# Patient Record
Sex: FEMALE | Race: WHITE | NOT HISPANIC OR LATINO | Employment: FULL TIME | URBAN - METROPOLITAN AREA
[De-identification: names, ages, dates, MRNs, and addresses within clinical notes are randomized per-mention and may not be internally consistent; named-entity substitution may affect disease eponyms.]

---

## 2018-01-02 ENCOUNTER — OFFICE VISIT (OUTPATIENT)
Dept: ORTHOPEDIC SURGERY | Facility: CLINIC | Age: 53
End: 2018-01-02

## 2018-01-02 VITALS
BODY MASS INDEX: 38.2 KG/M2 | WEIGHT: 223.77 LBS | HEART RATE: 64 BPM | SYSTOLIC BLOOD PRESSURE: 137 MMHG | HEIGHT: 64 IN | DIASTOLIC BLOOD PRESSURE: 95 MMHG

## 2018-01-02 DIAGNOSIS — M25.512 LEFT SHOULDER PAIN, UNSPECIFIED CHRONICITY: Primary | ICD-10-CM

## 2018-01-02 DIAGNOSIS — M19.019 AC (ACROMIOCLAVICULAR) ARTHRITIS: ICD-10-CM

## 2018-01-02 PROCEDURE — 20605 DRAIN/INJ JOINT/BURSA W/O US: CPT | Performed by: ORTHOPAEDIC SURGERY

## 2018-01-02 PROCEDURE — 99203 OFFICE O/P NEW LOW 30 MIN: CPT | Performed by: ORTHOPAEDIC SURGERY

## 2018-01-02 RX ORDER — BENZONATATE 200 MG/1
CAPSULE ORAL
Refills: 0 | COMMUNITY
Start: 2017-10-04 | End: 2018-10-04

## 2018-01-02 RX ORDER — LORATADINE 10 MG/1
10 TABLET ORAL DAILY
COMMUNITY

## 2018-01-02 RX ORDER — TRIAMCINOLONE ACETONIDE 40 MG/ML
20 INJECTION, SUSPENSION INTRA-ARTICULAR; INTRAMUSCULAR
Status: COMPLETED | OUTPATIENT
Start: 2018-01-02 | End: 2018-01-02

## 2018-01-02 RX ORDER — LOSARTAN POTASSIUM 50 MG/1
TABLET ORAL
Refills: 1 | COMMUNITY
Start: 2017-10-06 | End: 2019-09-17 | Stop reason: ALTCHOICE

## 2018-01-02 RX ORDER — LIDOCAINE HYDROCHLORIDE 10 MG/ML
1 INJECTION, SOLUTION INFILTRATION; PERINEURAL
Status: COMPLETED | OUTPATIENT
Start: 2018-01-02 | End: 2018-01-02

## 2018-01-02 RX ORDER — CETIRIZINE HYDROCHLORIDE 10 MG/1
10 TABLET ORAL DAILY
COMMUNITY

## 2018-01-02 RX ADMIN — TRIAMCINOLONE ACETONIDE 20 MG: 40 INJECTION, SUSPENSION INTRA-ARTICULAR; INTRAMUSCULAR at 15:16

## 2018-01-02 RX ADMIN — LIDOCAINE HYDROCHLORIDE 1 ML: 10 INJECTION, SOLUTION INFILTRATION; PERINEURAL at 15:16

## 2018-01-02 NOTE — PROGRESS NOTES
[facility] [place] Orthopaedic Surgery Clinic Note    Subjective     Pain of the Left Shoulder (Shoulder pain for 1.5 years with no known injury. She had a cortisone 1 year ago and that helped for only a few days. She tried an oral steroid which also helped. Pain today is minimal. The shoulder only really hurts with movement.)      CURT Alamo is a 52 y.o. female. Patient has had a year and a half history of left shoulder pain.  She is left-hand dominant.  She denies any history of trauma.  She's had a subacromial injection given anterolaterally by my former partner Dr. Childress which only helped for a few days.  She continues to have what she qualifies as a nagging type pain in the left shoulder.  She occasionally has pain in her armpit and her left chest wall.  She has some pain on the back of her arm to the elbow but rarely does she have any symptoms below the elbow.  She has tried anti-inflammatories without a lot of relief.  She is here with her  today for further evaluation and treatment     Past Medical History:   Diagnosis Date   • Hypertension       Past Surgical History:   Procedure Laterality Date   • CHOLECYSTECTOMY     • TUBAL ABDOMINAL LIGATION        Family History   Problem Relation Age of Onset   • Cancer Mother    • Diabetes Father    • Heart attack Father      Social History     Social History   • Marital status:      Spouse name: N/A   • Number of children: N/A   • Years of education: N/A     Occupational History   • Not on file.     Social History Main Topics   • Smoking status: Never Smoker   • Smokeless tobacco: Never Used   • Alcohol use No   • Drug use: No   • Sexual activity: Defer     Other Topics Concern   • Not on file     Social History Narrative   • No narrative on file      No current outpatient prescriptions on file prior to visit.     No current facility-administered medications on file prior to visit.       No Known Allergies     The following portions of the patient's  "history were reviewed and updated as appropriate: allergies, current medications, past family history, past medical history, past social history, past surgical history and problem list.    Review of Systems   Constitutional: Negative.    HENT: Negative.    Eyes: Negative.    Respiratory: Negative.    Cardiovascular: Negative.    Gastrointestinal: Negative.    Endocrine: Positive for cold intolerance.   Genitourinary: Negative.    Musculoskeletal: Positive for arthralgias and joint swelling.   Skin: Negative.    Allergic/Immunologic: Positive for environmental allergies.   Neurological: Negative.    Hematological: Negative.    Psychiatric/Behavioral: Negative.         Objective      Physical Exam  /95  Pulse 64  Ht 162.6 cm (64\")  Wt 101 kg (223 lb 12.3 oz)  BMI 38.41 kg/m2    Body mass index is 38.41 kg/(m^2).    General  Mental Status - alert  General Appearance - cooperative, well groomed, not in acute distress  Orientation - Oriented X3  Build & Nutrition - well developed and well nourished  Posture - normal posture  Gait - normal gait     Integumentary  Global Assessment  Examination of related systems reveals - no lymphadenopathy  General Characteristics  Overall examination of the patient's skin reveals - no rashes, no evidence of scars, no suspicious lesions and no bruises.  Color - normal coloration of skin.    Ortho Exam  Musculoskeletal   Upper Extremity   Left Shoulder     Inspection and Palpation:     Tenderness - mildly over left acromioclavicular joint    Crepitus - none    Sensation is normal    Examination reveals no ecchymosis.      Strength and Tone:    Supraspinatus - 4+ out of 5 with pain    External Rotators-5 out of 5    Infraspinatus - 5/5    Subscapularis - 5 out of 5    Deltoid - 5/5     Range of Motion   Right shoulder:    Internal Rotation: ROM - T7    External Rotation: AROM - 80 degrees    Elevation through flexion: AROM - 180 degrees    Left Shoulder:    Internal Rotation: ROM " - L4    External Rotation: AROM - 70 degrees    Elevation through flexion: AROM - 150 degrees      Instability   Left shoulder    Sulcus sign negative    Apprehension test negative    Chase relocation test negative    Jerk test negative     Impingement   Left shoulder    Kee-Jeferson impingement test positive    Neer impingement test positive     Functional Testing   Left shoulder    AC crossover adduction test positive    Abdominal compression test negative    Lift-off sign negative    Speed's test negative    Harrison's test negative    Hornblower's sign negative     Imaging/Studies  3 views of the left shoulder are taken in the office today and reviewed.  Patient has a type II acromion, no significant glenohumeral degenerative changes, patient has hypertrophy at the distal clavicle with moderate acromioclavicular joint space narrowing.    Assessment:  1. Left shoulder pain, unspecified chronicity    2. AC (acromioclavicular) arthritis        Plan:  1. Diagnostic and therapeutic injection into the left acromial clavicular joint will be given today  2. Continue on over-the-counter medications as needed for discomfort  3. Follow-up in a month and assess her pain and response to the injection.  We have recommended an MRI today but the patient is apprehensive because of the cost.  If she is not a lot better, we can consider further imaging.  4. Cervical spine must be kept in mind here given the axillary pain.      Medical Decision Making  Management Options : over-the-counter medicine and prescription/IM medicine  Data/Risk: radiology tests and independent visualization of imaging, lab tests, or EMG/NCV    Dashawn Love MD  01/02/18  3:16 PM

## 2018-01-02 NOTE — PROGRESS NOTES
Procedure   Medium Joint Arthrocentesis  Date/Time: 1/2/2018 3:16 PM  Consent given by: patient  Site marked: site marked  Timeout: Immediately prior to procedure a time out was called to verify the correct patient, procedure, equipment, support staff and site/side marked as required   Supporting Documentation  Indications: pain   Procedure Details  Location: shoulder - L acromioclavicular  Preparation: Patient was prepped and draped in the usual sterile fashion  Needle size: 25 G  Approach: superior  Medications administered: 1 mL lidocaine 1 %; 20 mg triamcinolone acetonide 40 MG/ML

## 2018-10-03 NOTE — PROGRESS NOTES
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      CC:  Chief Complaint   Patient presents with   • Hypertension     Consult   • Chest Pain   • Shortness of Breath   • Irregular Heart Beat       PROBLEM LIST:  1.  Chest pain syndrome.    a. 09/28/2012:  Lexiscan Cardiolite within  normal limits with perfusion consistent with left bundle branch block.  EF 60%.    b. Echocardiogram, September 2012; normal LVEF, left bundle depolarization, mild MR/TR.   c. 9/30/14 Mavis scan Cardiolite no evidence of ischemia, fixed anterior septal and anterior wall defects without corresponding wall motion abnormality likely artifact d/t LBBB  2. Dyslipidemia.    a.  CRP 26 with LDL of 100.  Intolerant to statin therapy.    3. Hypertension.  4. LBBB  5. Obesity.    6. Left bundle branch block.    7. GERD.    8. Surgical history:    a. Cholecystectomy.  b. BTL.      Allergies  Allergies   Allergen Reactions   • Lisinopril Cough       Current Medications    Current Outpatient Prescriptions:   •  cetirizine (zyrTEC) 10 MG tablet, Take 10 mg by mouth Daily., Disp: , Rfl:   •  loratadine (CLARITIN) 10 MG tablet, Take 10 mg by mouth Daily., Disp: , Rfl:   •  losartan (COZAAR) 50 MG tablet, , Disp: , Rfl: 1  •  Multiple Vitamins-Minerals (CENTRUM SILVER 50+WOMEN PO), Take 1 tablet by mouth Daily., Disp: , Rfl:      History of Present Illness   HPI  This is a 53-year-old female with the above-mentioned PMH who presents for consult from [facility] for evaluation of hypertension.  The patient had previously been seen by a clinic in 2015.    Recently w [facility] and [facility].  Notes is a vegetarian but enjoys sodas and [employer] foods most am.  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      Pt denies any  orthopnea, PND, palpitations, lower extremity edema, or claudication. Pt denies history of CHF, DVT, PE, MI, CVA, TIA, or rheumatic fever.       ROS  Review of Systems   Constitution: Positive for weight gain. Negative for chills, fever, weakness, malaise/fatigue, night sweats and weight loss.   HENT: Negative for hearing loss and nosebleeds.    Eyes: Negative for blurred vision, vision loss in left eye, vision loss in right eye, visual disturbance and visual halos.   Cardiovascular: Positive for chest pain and dyspnea on exertion. Negative for claudication, cyanosis, irregular heartbeat, leg swelling, near-syncope, orthopnea, palpitations, paroxysmal nocturnal dyspnea and syncope.   Respiratory: Negative for cough, hemoptysis, shortness of breath, snoring and wheezing.    Endocrine: Negative for cold intolerance, heat intolerance, polydipsia, polyphagia and polyuria.   Hematologic/Lymphatic: Negative for adenopathy and bleeding problem. Does not bruise/bleed easily.   Skin: Negative for dry skin, poor wound healing and rash.   Musculoskeletal: Negative for falls, joint pain, joint swelling, muscle cramps, muscle weakness, myalgias and neck pain.   Gastrointestinal: Negative for bloating, abdominal pain, change in bowel habit, bowel incontinence, constipation, diarrhea, dysphagia, excessive appetite, heartburn, hematemesis, hematochezia, jaundice, melena, nausea and vomiting.   Genitourinary: Negative for bladder incontinence, dysuria, flank pain, hematuria, hesitancy and nocturia.   Neurological: Negative for aphonia, excessive daytime sleepiness, dizziness, focal weakness, headaches, light-headedness, loss of balance, seizures, sensory change, tremors and vertigo.   Psychiatric/Behavioral: Negative for altered mental status, depression, memory loss, substance abuse and suicidal ideas. The patient is not nervous/anxious.    All other systems reviewed and are negative.      SOCIAL HX  Social  "History     Social History   • Marital status:      Spouse name: N/A   • Number of children: N/A   • Years of education: N/A     Occupational History   • Not on file.     Social History Main Topics   • Smoking status: Never Smoker   • Smokeless tobacco: Never Used   • Alcohol use No   • Drug use: No   • Sexual activity: Defer     Other Topics Concern   • Not on file     Social History Narrative   • No narrative on file       FAMILY HX  Family History   Problem Relation Age of Onset   • Cancer Mother    • Diabetes Father    • Heart attack Father        Vitals:    10/04/18 1042   BP: 121/81   BP Location: Left arm   Patient Position: Sitting   Pulse: 69   Weight: 105 kg (231 lb)   Height: 165.1 cm (65\")       PHYSICAL EXAMINATION:  Physical Exam   Constitutional: She is oriented to person, place, and time. She appears well-developed and well-nourished. No distress.   HENT:   Head: Normocephalic and atraumatic.   Nose: Nose normal.   Mouth/Throat: Uvula is midline, oropharynx is clear and moist and mucous membranes are normal.   Eyes: Pupils are equal, round, and reactive to light. Conjunctivae and EOM are normal. No scleral icterus.   Neck: Normal range of motion. Neck supple. No hepatojugular reflux and no JVD present. Carotid bruit is not present. No tracheal deviation present. No thyromegaly present.   Cardiovascular: Normal rate, regular rhythm, S1 normal, S2 normal, intact distal pulses and normal pulses.  PMI is not displaced.  Exam reveals no gallop, no distant heart sounds, no friction rub, no midsystolic click and no opening snap.    No murmur heard.  Pulses:       Radial pulses are 2+ on the right side, and 2+ on the left side.        Dorsalis pedis pulses are 2+ on the right side, and 2+ on the left side.        Posterior tibial pulses are 2+ on the right side, and 2+ on the left side.   Pulmonary/Chest: Effort normal and breath sounds normal. She has no wheezes. She has no rhonchi. She has no rales. "   Abdominal: Soft. Bowel sounds are normal. She exhibits no mass. There is no tenderness. There is no guarding.   Musculoskeletal: She exhibits no edema or tenderness.   Lymphadenopathy:     She has no cervical adenopathy.   Neurological: She is alert and oriented to person, place, and time.   Skin: Skin is warm, dry and intact. No rash noted. No cyanosis or erythema. Nails show no clubbing.   Psychiatric: She has a normal mood and affect. Her behavior is normal.   Nursing note and vitals reviewed.      Diagnostic Data:    ECG 12 Lead  Date/Time: 10/4/2018 10:52 AM  Performed by: WHIT SPENCER  Authorized by: WHIT SPENCER   Comparison: not compared with previous ECG   Rhythm: sinus rhythm  Conduction: complete LBBB  Clinical impression: non-specific ECG          Lab Results   Component Value Date    CHLPL 177 09/30/2014    TRIG 108 09/30/2014    HDL 37 (L) 09/30/2014     Lab Results   Component Value Date    GLUCOSE 106 (H) 09/30/2014    BUN 11 09/30/2014    CREATININE 0.8 09/30/2014     09/30/2014    K 4.4 09/30/2014     (H) 09/30/2014    CO2 27 09/30/2014     Lab Results   Component Value Date    HGBA1C 5.7 09/30/2014         ASSESSMENT:   Diagnosis Plan   1. Essential hypertension  ECG 12 Lead   2. LBBB (left bundle branch block)     3. Mixed hyperlipidemia     4. Unstable angina pectoris (CMS/HCC)           PLAN:  Lexiscan cardiolite to assess for ischemic burden.    COntinue current antihypertensive.    Exercise regimen detailed in clinic.    Intermittent fasting and CHO restriction discussed.    Scribed for Whit Spencer MD by Heidy East PA-C. 10/4/2018  11:10 AM     Whit Spencer MD, [facility]

## 2018-10-04 ENCOUNTER — CONSULT (OUTPATIENT)
Dept: CARDIOLOGY | Facility: CLINIC | Age: 53
End: 2018-10-04

## 2018-10-04 VITALS
DIASTOLIC BLOOD PRESSURE: 81 MMHG | HEIGHT: 65 IN | HEART RATE: 69 BPM | BODY MASS INDEX: 38.49 KG/M2 | SYSTOLIC BLOOD PRESSURE: 121 MMHG | WEIGHT: 231 LBS

## 2018-10-04 DIAGNOSIS — I10 ESSENTIAL HYPERTENSION: Primary | ICD-10-CM

## 2018-10-04 DIAGNOSIS — I20.0 UNSTABLE ANGINA PECTORIS (HCC): ICD-10-CM

## 2018-10-04 DIAGNOSIS — I44.7 LBBB (LEFT BUNDLE BRANCH BLOCK): ICD-10-CM

## 2018-10-04 DIAGNOSIS — E78.2 MIXED HYPERLIPIDEMIA: ICD-10-CM

## 2018-10-04 PROCEDURE — 99244 OFF/OP CNSLTJ NEW/EST MOD 40: CPT | Performed by: INTERNAL MEDICINE

## 2018-10-04 PROCEDURE — 93000 ELECTROCARDIOGRAM COMPLETE: CPT | Performed by: INTERNAL MEDICINE

## 2018-10-19 ENCOUNTER — HOSPITAL ENCOUNTER (OUTPATIENT)
Dept: CARDIOLOGY | Facility: HOSPITAL | Age: 53
Discharge: HOME | End: 2018-10-19
Attending: INTERNAL MEDICINE

## 2018-10-19 VITALS — BODY MASS INDEX: 38.49 KG/M2 | HEIGHT: 65 IN | WEIGHT: 231 LBS

## 2018-10-19 DIAGNOSIS — I20.0 UNSTABLE ANGINA PECTORIS (HCC): ICD-10-CM

## 2018-10-19 PROCEDURE — 78452 HT MUSCLE IMAGE SPECT MULT: CPT

## 2018-10-19 PROCEDURE — 93017 CV STRESS TEST TRACING ONLY: CPT

## 2018-10-19 PROCEDURE — A9500 TC99M SESTAMIBI: HCPCS | Performed by: INTERNAL MEDICINE

## 2018-10-19 PROCEDURE — 93018 CV STRESS TEST I&R ONLY: CPT | Performed by: INTERNAL MEDICINE

## 2018-10-19 PROCEDURE — 78452 HT MUSCLE IMAGE SPECT MULT: CPT | Performed by: INTERNAL MEDICINE

## 2018-10-19 PROCEDURE — 25010000002 REGADENOSON 0.4 MG/5ML SOLUTION: Performed by: INTERNAL MEDICINE

## 2018-10-19 PROCEDURE — 0 TECHNETIUM SESTAMIBI: Performed by: INTERNAL MEDICINE

## 2018-10-19 RX ADMIN — TECHNETIUM TC 99M SESTAMIBI 1 DOSE: 1 INJECTION INTRAVENOUS at 09:55

## 2018-10-19 RX ADMIN — TECHNETIUM TC 99M SESTAMIBI 1 DOSE: 1 INJECTION INTRAVENOUS at 08:25

## 2018-10-19 RX ADMIN — REGADENOSON 0.4 MG: 0.08 INJECTION, SOLUTION INTRAVENOUS at 09:55

## 2018-10-22 LAB
BH CV NUCLEAR PRIOR STUDY: 1
BH CV STRESS BP STAGE 1: NORMAL
BH CV STRESS BP STAGE 2: NORMAL
BH CV STRESS COMMENTS STAGE 1: NORMAL
BH CV STRESS COMMENTS STAGE 2: NORMAL
BH CV STRESS DOSE REGADENOSON STAGE 1: 0.4
BH CV STRESS DURATION MIN STAGE 1: 4
BH CV STRESS DURATION MIN STAGE 2: 2
BH CV STRESS DURATION SEC STAGE 1: 0
BH CV STRESS DURATION SEC STAGE 2: 0
BH CV STRESS HR STAGE 1: 87
BH CV STRESS HR STAGE 2: 78
BH CV STRESS PROTOCOL 1: NORMAL
BH CV STRESS RECOVERY BP: NORMAL MMHG
BH CV STRESS RECOVERY HR: 80 BPM
BH CV STRESS STAGE 1: 1
BH CV STRESS STAGE 2: 2
LV EF NUC BP: 51 %
MAXIMAL PREDICTED HEART RATE: 165 BPM
PERCENT MAX PREDICTED HR: 59.39 %
STRESS BASELINE BP: NORMAL MMHG
STRESS BASELINE HR: 55 BPM
STRESS PERCENT HR: 70 %
STRESS POST PEAK BP: NORMAL MMHG
STRESS POST PEAK HR: 98 BPM
STRESS TARGET HR: 140 BPM

## 2018-10-24 ENCOUNTER — TELEPHONE (OUTPATIENT)
Dept: CARDIOLOGY | Facility: CLINIC | Age: 53
End: 2018-10-24

## 2018-10-24 NOTE — TELEPHONE ENCOUNTER
Called patient and left VM stress test is low risk at current. Unchanged from 2014, follow up as scheduled.

## 2019-04-18 ENCOUNTER — OFFICE VISIT (OUTPATIENT)
Dept: ORTHOPEDIC SURGERY | Facility: CLINIC | Age: 54
End: 2019-04-18

## 2019-04-18 VITALS — HEIGHT: 65 IN | OXYGEN SATURATION: 97 % | WEIGHT: 233.91 LBS | BODY MASS INDEX: 38.97 KG/M2 | HEART RATE: 68 BPM

## 2019-04-18 DIAGNOSIS — M65.332 TRIGGER MIDDLE FINGER OF LEFT HAND: Primary | ICD-10-CM

## 2019-04-18 DIAGNOSIS — M79.642 LEFT HAND PAIN: ICD-10-CM

## 2019-04-18 DIAGNOSIS — M25.642 STIFFNESS OF FINGER JOINT OF LEFT HAND: ICD-10-CM

## 2019-04-18 DIAGNOSIS — M19.012 ARTHRITIS OF LEFT ACROMIOCLAVICULAR JOINT: ICD-10-CM

## 2019-04-18 PROCEDURE — 99214 OFFICE O/P EST MOD 30 MIN: CPT | Performed by: ORTHOPAEDIC SURGERY

## 2019-04-18 PROCEDURE — 20550 NJX 1 TENDON SHEATH/LIGAMENT: CPT | Performed by: ORTHOPAEDIC SURGERY

## 2019-04-18 RX ORDER — TRIAMCINOLONE ACETONIDE 40 MG/ML
20 INJECTION, SUSPENSION INTRA-ARTICULAR; INTRAMUSCULAR
Status: COMPLETED | OUTPATIENT
Start: 2019-04-18 | End: 2019-04-18

## 2019-04-18 RX ORDER — LIDOCAINE HYDROCHLORIDE 10 MG/ML
1 INJECTION, SOLUTION INFILTRATION; PERINEURAL
Status: COMPLETED | OUTPATIENT
Start: 2019-04-18 | End: 2019-04-18

## 2019-04-18 RX ADMIN — TRIAMCINOLONE ACETONIDE 20 MG: 40 INJECTION, SUSPENSION INTRA-ARTICULAR; INTRAMUSCULAR at 09:56

## 2019-04-18 RX ADMIN — LIDOCAINE HYDROCHLORIDE 1 ML: 10 INJECTION, SOLUTION INFILTRATION; PERINEURAL at 09:56

## 2019-04-18 NOTE — PROGRESS NOTES
Procedure   Trigger finger injection  Site marked: site marked  Timeout: Immediately prior to procedure a time out was called to verify the correct patient, procedure, equipment, support staff and site/side marked as required   Supporting Documentation  Indications: pain   Procedure Details  Location: long finger - Long finger joint: trigger finger.  Preparation: Patient was prepped and draped in the usual sterile fashion  Needle size: 25 G  Approach: volar  Medications administered: 1 mL lidocaine 1 %; 20 mg triamcinolone acetonide 40 MG/ML  Patient tolerance: patient tolerated the procedure well with no immediate complications

## 2019-04-18 NOTE — PROGRESS NOTES
"    [facility] [place] Orthopa[facility] Surgery Clinic Note    Subjective     CC: Pain of the Left Hand (Left hand pain and swelling x 7 months ,middle finger area of hand, has taken Advil, prednisone, medrol dose pack, pain is a 3/10.)      CURT Alamo is a 53 y.o. female.  Patient is here today for follow-up of her left shoulder and for new problem today regarding her left long digit.  At her visit on 1/2/2018, she was injected into the left AC joint and has gotten good relief from that injection.    With regards to her left long digit, this has bothered her for the last 6-8 months.  She has had 2 rounds of oral prednisone which has helped but not lasted.  She has stiffness in the left long digit and difficulty moving the finger as well as she would like.  No obvious catching.  She has a history of a trigger finger on another hand.  She rates the pain as 3 out of 10.      ROS:    Constiutional:Pt denies fever, chills, nausea, or vomiting.  MSK:as above    Objective      Past Medical History  Past Medical History:   Diagnosis Date   • Anemia    • Edema    • Hyperlipidemia    • Hypertension          Physical Exam  Pulse 68   Ht 165.1 cm (65\")   Wt 106 kg (233 lb 14.5 oz)   SpO2 97%   BMI 38.92 kg/m²     Body mass index is 38.92 kg/m².    Patient is well nourished and well developed.        Ortho Exam  Peripheral Vascular   Bilateral Upper Extremity    No cyanotic nail beds    Pink nail beds and rapid capillary refill   Palpation    Radial Pulse - Bilaterally normal    Neurologic   Sensory: Light touch intact- Left hand       Left Upper Extremity    Left wrist extensors: 5/5    Left wrist flexors: 5/5    Left intrinsics: 5/5    Musculoskeletal      Left Elbow    Forearm supination: AROM - 90 degrees    Forearm pronation: AROM - 90 degrees     Inspection and Palpation   Left Wrist      Tenderness -none    Swelling - none    Crepitus - none    Muscle tone - no atrophy        ROJM:      Left Wrist    Flexion: AROM - 90 " degrees    Extension: AROM - 90 degrees     Deformities, Malalignments, Discrepancies    None     Functional Testing   Left Wrist    Tinel's Sign-- negative    Phalen's Sign-- negative    Carpal Compression Test-- negative    Finklestein's Test-- negative    Thumb CMC joint--negative       Strength and Tone    Left  strength: good         Hand Exam: Tender at the A1 pulley of the left long digit, swollen left long digit PIP joint, lacks full tip to palm flexion by 5 mm    Imaging/Labs/EMG Reviewed:    Imaging Results (last 24 hours)     Procedure Component Value Units Date/Time    XR Hand 3+ View Left [[id number]] Resulted:  04/18/19 0951     Updated:  04/18/19 0951    Narrative:       Left Hand X-Ray    Indication: Pain    Views:  AP, Lateral, and Oblique     Comparison: none    Findings:  No fracture  No bony lesion  Normal soft tissues  Normal joint spaces    Impression: Negative left hand x-ray for acute bony abnormalities.                Assessment:  1. Trigger middle finger of left hand    2. Stiffness of finger joint of left hand    3. Arthritis of left acromioclavicular joint    4. Left hand pain        Plan:  1. Recommend over the counter anti-inflammatories for pain and/or swelling  2. Left shoulder pain with AC joint arthritis--resolved after AC joint injection  3. Left long digit trigger finger--injection into the A1 pulley will be given today for diagnostic and therapeutic purposes.  4. Left long digit PIP stiffness--likely secondary to the trigger digit but we recommend she would work on range of motion after the injection.  5. See her back in about 6 weeks and if she is no better, we will recommend release of the A1 pulley and likely formal hand therapy.      Medical Decision Making  Management Options : over-the-counter medicine and prescription/IM medicine  Data/Risk: radiology tests    Dashawn Love MD  04/18/19  10:08 AM

## 2019-04-18 NOTE — PROGRESS NOTES
"    [facility] [place] Orthopaedic Surgery Clinic Note    Subjective     Pain of the Left Hand (Left hand pain and swelling x 7 months ,middle finger area of hand, has taken Advil, prednisone, medrol dose pack, pain is a 3/10.)      CURT Alamo is a 53 y.o. female. ***     Past Medical History:   Diagnosis Date   • Anemia    • Edema    • Hyperlipidemia    • Hypertension       Past Surgical History:   Procedure Laterality Date   • CHOLECYSTECTOMY     • TUBAL ABDOMINAL LIGATION        Family History   Problem Relation Age of Onset   • Cancer Mother    • Diabetes Father    • Heart attack Father      Social History     Socioeconomic History   • Marital status:      Spouse name: Not on file   • Number of children: Not on file   • Years of education: Not on file   • Highest education level: Not on file   Tobacco Use   • Smoking status: Never Smoker   • Smokeless tobacco: Never Used   Substance and Sexual Activity   • Alcohol use: No   • Drug use: No   • Sexual activity: Defer      Current Outpatient Medications on File Prior to Visit   Medication Sig Dispense Refill   • cetirizine (zyrTEC) 10 MG tablet Take 10 mg by mouth Daily.     • loratadine (CLARITIN) 10 MG tablet Take 10 mg by mouth Daily.     • losartan (COZAAR) 50 MG tablet   1   • Multiple Vitamins-Minerals (CENTRUM SILVER 50+WOMEN PO) Take 1 tablet by mouth Daily.       No current facility-administered medications on file prior to visit.       Allergies   Allergen Reactions   • Lisinopril Cough        The following portions of the patient's history were reviewed and updated as appropriate: {history reviewed:20406::\"allergies\",\"current medications\",\"past family history\",\"past medical history\",\"past social history\",\"past surgical history\",\"problem list\"}.    Review of Systems   Constitutional: Negative.    HENT: Negative.    Eyes: Negative.    Respiratory: Negative.    Cardiovascular: Negative.    Gastrointestinal: Negative.    Endocrine: Negative.    " "  Genitourinary: Negative.    Musculoskeletal: Positive for arthralgias.   Skin: Negative.    Allergic/Immunologic: Negative.    Neurological: Negative.    Hematological: Negative.    Psychiatric/Behavioral: Negative.         Objective      Physical Exam  Pulse 68   Ht 165.1 cm (65\")   Wt 106 kg (233 lb 14.5 oz)   SpO2 97%   BMI 38.92 kg/m²     Body mass index is 38.92 kg/m².    General  Mental Status - alert  General Appearance - cooperative, well groomed, not in acute distress  Orientation - Oriented X3  Build & Nutrition - well developed and well nourished  Posture - normal posture  Gait - normal gait     Integumentary  Global Assessment  Examination of related systems reveals - no lymphadenopathy  Ears:  No abnormality  Nose:  No mucous drainage  General Characteristics  Overall examination of the patient's skin reveals - no rashes, no evidence of scars, no suspicious lesions and no bruises.  Color - normal coloration of skin.  Vascular: Brisk capillary refill in all extremities    Ortho Exam  ***    Imaging/Studies    Imaging Results (last 24 hours)     ** No results found for the last 24 hours. **          Assessment:  1. Left shoulder pain, unspecified chronicity    2. Arthritis of left acromioclavicular joint    3. Trigger middle finger of left hand    4. Left hand pain        Plan:  1. Continue over-the-counter medication as needed for discomfort  2. ***      Medical Decision Making  Management Options : {[facility] - Management Options:83767}  Data/Risk: {MDM - Data/Risk:66199}    CARROLL Serrano(R)  04/18/19  9:16 AM          "

## 2019-05-28 ENCOUNTER — TRANSCRIBE ORDERS (OUTPATIENT)
Dept: ADMINISTRATIVE | Facility: HOSPITAL | Age: 54
End: 2019-05-28

## 2019-05-28 DIAGNOSIS — Z12.31 VISIT FOR SCREENING MAMMOGRAM: Primary | ICD-10-CM

## 2019-07-16 ENCOUNTER — APPOINTMENT (OUTPATIENT)
Dept: MAMMOGRAPHY | Facility: HOSPITAL | Age: 54
End: 2019-07-16

## 2019-09-10 ENCOUNTER — HOSPITAL ENCOUNTER (OUTPATIENT)
Dept: MAMMOGRAPHY | Facility: HOSPITAL | Age: 54
Discharge: HOME | End: 2019-09-10
Admitting: PHYSICIAN ASSISTANT

## 2019-09-10 ENCOUNTER — APPOINTMENT (OUTPATIENT)
Dept: OTHER | Facility: HOSPITAL | Age: 54
End: 2019-09-10

## 2019-09-10 DIAGNOSIS — Z12.31 VISIT FOR SCREENING MAMMOGRAM: ICD-10-CM

## 2019-09-10 PROCEDURE — 77063 BREAST TOMOSYNTHESIS BI: CPT | Performed by: RADIOLOGY

## 2019-09-10 PROCEDURE — 77063 BREAST TOMOSYNTHESIS BI: CPT

## 2019-09-10 PROCEDURE — 77067 SCR MAMMO BI INCL CAD: CPT | Performed by: RADIOLOGY

## 2019-09-10 PROCEDURE — 77067 SCR MAMMO BI INCL CAD: CPT

## 2019-09-17 ENCOUNTER — OFFICE VISIT (OUTPATIENT)
Dept: ORTHOPEDIC SURGERY | Facility: CLINIC | Age: 54
End: 2019-09-17

## 2019-09-17 VITALS — HEIGHT: 65 IN | OXYGEN SATURATION: 98 % | BODY MASS INDEX: 37.65 KG/M2 | HEART RATE: 94 BPM | WEIGHT: 225.97 LBS

## 2019-09-17 DIAGNOSIS — R20.2 NUMBNESS AND TINGLING IN LEFT ARM: Primary | ICD-10-CM

## 2019-09-17 DIAGNOSIS — R20.0 NUMBNESS AND TINGLING IN LEFT ARM: Primary | ICD-10-CM

## 2019-09-17 PROCEDURE — 99213 OFFICE O/P EST LOW 20 MIN: CPT | Performed by: ORTHOPAEDIC SURGERY

## 2019-09-17 RX ORDER — MONTELUKAST SODIUM 10 MG/1
TABLET ORAL
COMMUNITY

## 2019-09-17 RX ORDER — VALSARTAN AND HYDROCHLOROTHIAZIDE 160; 12.5 MG/1; MG/1
TABLET, FILM COATED ORAL
COMMUNITY

## 2019-09-17 NOTE — PROGRESS NOTES
"    [facility] Note    Subjective     CC: Follow-up, Pain, and Numbness of the Left Hand      HPI    Alka Alamo is a 53 y.o. female.  Patient is here for a new problem today regarding her left arm.  This has been going on for several months.  She is having some twitching in her index digit she is concerned that she has Parkinson's disease.  She tells me that she occasionally has numbness and tingling in her ulnar 2 digits but primarily has it in her radial 3 digits.  The twitching in her index digit wakes her up at night.  She has had no treatment thus far.  We most recently saw her for a long digit trigger finger that was injected and she did well from.      ROS:    Constiutional:Pt denies fever, chills, nausea, or vomiting.  MSK:as above    Objective      Past Medical History  Past Medical History:   Diagnosis Date   • Anemia    • Edema    • Hyperlipidemia    • Hypertension          Physical Exam  Pulse 94   Ht 165.1 cm (65\")   Wt 102 kg (225 lb 15.5 oz)   SpO2 98%   Breastfeeding? No   BMI 37.60 kg/m²     Body mass index is 37.6 kg/m².    Patient is well nourished and well developed.        Ortho Exam  Peripheral Vascular   Bilateral Upper Extremity    No cyanotic nail beds    Pink nail beds and rapid capillary refill   Palpation    Radial Pulse - Bilaterally normal    Neurologic   Sensory: Light touch intact- Right and left hand    Left Upper Extremity    Left wrist extensors: 5/5    Left wrist flexors: 5/5    Left intrinsics: 5/5      Right Upper Extremity    Right wrist extensors: 5/5    Right wrist flexors: 5/5    Right intrinsics: 5/5    Musculoskeletal   Left Elbow    Forearm supination: AROM - 90 degrees    Forearm pronation: AROM - 90 degrees   Right Elbow    Forearm supination: AROM - 90 degrees    Forearm pronation: AROM - 90 degrees     Inspection and Palpation   Right Wrist      Tenderness - none    Swelling - none    Crepitus - none    Muscle tone - no atrophy   Left " Wrist    Tenderness - none    Swelling - none    Crepitus - none    Muscle tone - no atrophy     ROJM:   Left Wrist    Flexion: AROM - 90 degrees    Extension: AROM - 90 degrees   Right Wrist    Flexion: AROM - 90 degrees    Extension: AROM - 90 degrees     Deformities, Malalignments, Discrepancies    None     Functional Testing   Right    Tinel's Sign-- negative    Phalen's Sign--negative    Carpal Compression Test--positive    Thenar wasting--minimal    APB--4+/5    Elbow Flexion test--negative    Cubital tunnel signs--negative   Left     Tinel's Sign--negative    Phalen's Sign--negative    Carpal Compression Test-- positive    Thenar Wasting--minimal    APB--4+/5    Elbow Flexion test--negative    Cubital tunnel signs--negative       Strength and Tone    Right  strength: good    Left  strength: good     Hand Exam:          Imaging/Labs/EMG Reviewed:  Imaging Results (last 24 hours)     ** No results found for the last 24 hours. **          Assessment:  1. Numbness and tingling in left arm        Plan:  1. Recommend over the counter anti-inflammatories for pain and/or swelling  2. Numbness and tingling left arm--I told her that is unlikely she has Parkinson's disease.  We we will get some nerve studies down the road if she continues to have symptoms.  For now, I suggest that she get to the [facility] to get an Orthoplast splint to use at night for her ulnar and median nerve symptoms.  I suggested a night splint for the elbow that extends into the wrist and palm to keep her wrist at neutral as well.  Follow-up in about 8 weeks we will see how she is done with this.  If she is no better, nerve studies will be requested.      Dashawn Love MD  09/17/19  5:56 PM

## 2019-09-27 ENCOUNTER — TREATMENT (OUTPATIENT)
Dept: PHYSICAL THERAPY | Facility: CLINIC | Age: 54
End: 2019-09-27

## 2019-09-27 DIAGNOSIS — G56.22 CUBITAL TUNNEL SYNDROME ON LEFT: Primary | ICD-10-CM

## 2019-09-27 DIAGNOSIS — G56.02 CARPAL TUNNEL SYNDROME OF LEFT WRIST: ICD-10-CM

## 2019-09-27 PROCEDURE — L3808 WHFO, RIGID W/O JOINTS: HCPCS | Performed by: PHYSICAL THERAPIST

## 2019-09-27 PROCEDURE — L3702 EO W/O JOINTS CF: HCPCS | Performed by: PHYSICAL THERAPIST

## 2019-09-30 NOTE — PROGRESS NOTES
Alka Alamo 1965   Diagnosis/ Surgery: Left Cubital and Left Carpal Tunnel syndrome.              Date Of Onset: Insidious onset    Date Of Surgery:N/A    Hand Dominance: Right  History of Present Condition: over a period of the past few months the pt has developed numbness into left ring/small fingers at times and left thumb/index/long finger at times, but never at the same time. Seems to be worsening with time.  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  Schedule to have hernia surgery next week.  PMH in [facility].    Pain: None, but has numbness at times    Edema: N/A  Sensibility: WNL   Wound Status:N/A  ROM/ Strength/Test: Tinel's left elbow cubital and left wrist carpal tunnel positive. Median nerve compression test +    Splinting:  · Patient was measure and fit with a custom fabricated left elbow anterior extension splint with elbow resting about about 25 degrees and forearm neutral.  And a wrist immobilization splint in neutral position.  Designed so either one can be worn separately or together.   · Patient was instructed in wearing schedule, precautions and care of the splint during this visit.   · Patient was instructed in proper donning/doffing of splint.   Assessment:  · Patient was fitted and appropriate splint was fabricated this date.  · Patient reported that splint was comfortable and had no complications with the fit of the splint.  · Patient was instructed and patient verbalized understanding of precautions, wear and care of the splint.   · Patient demonstrated independent donning/doffing of splint during treatment today.  Goals:  · Patient was fitted properly with appropriate splint for diagnosis  · Patient was educated on precautions, wear schedule and care of splint  · Patient demonstrated independence with donning/doffing of the splint.  · Splint was provided to Protect Healing Structures, Restrict Mobility, Improve joint alignment.  Plan:  · No  additional treatment is required for this patient at this time. The patient is therefore discharged from therapy.  · Patient advised to contact therapist with any additional questions or concerns regarding the fit and function of the splint.  · Patient will be seen for splint issues as needed   · Wear Instructions: Wear at night while sleeping.      PT SIGNATURE: Johnie Snider PT, T  DATE TREATMENT INITIATED: 9/27/2019

## 2019-11-12 ENCOUNTER — TELEPHONE (OUTPATIENT)
Dept: ORTHOPEDIC SURGERY | Facility: CLINIC | Age: 54
End: 2019-11-12

## 2024-02-27 ENCOUNTER — TRANSCRIBE ORDERS (OUTPATIENT)
Dept: ADMINISTRATIVE | Facility: HOSPITAL | Age: 59
End: 2024-02-27
Payer: COMMERCIAL

## 2024-02-27 DIAGNOSIS — Z12.31 VISIT FOR SCREENING MAMMOGRAM: Primary | ICD-10-CM

## 2024-03-07 ENCOUNTER — HOSPITAL ENCOUNTER (OUTPATIENT)
Dept: MAMMOGRAPHY | Facility: HOSPITAL | Age: 59
Discharge: HOME | End: 2024-03-07
Admitting: INTERNAL MEDICINE
Payer: COMMERCIAL

## 2024-03-07 DIAGNOSIS — Z12.31 VISIT FOR SCREENING MAMMOGRAM: ICD-10-CM

## 2024-03-07 PROCEDURE — 77063 BREAST TOMOSYNTHESIS BI: CPT

## 2024-03-07 PROCEDURE — 77067 SCR MAMMO BI INCL CAD: CPT
